# Patient Record
Sex: MALE | Race: WHITE | NOT HISPANIC OR LATINO | Employment: OTHER | URBAN - NONMETROPOLITAN AREA
[De-identification: names, ages, dates, MRNs, and addresses within clinical notes are randomized per-mention and may not be internally consistent; named-entity substitution may affect disease eponyms.]

---

## 2018-02-26 ENCOUNTER — DOCUMENTATION ONLY (OUTPATIENT)
Dept: FAMILY MEDICINE | Facility: OTHER | Age: 63
End: 2018-02-26

## 2023-01-01 ENCOUNTER — OFFICE VISIT (OUTPATIENT)
Dept: FAMILY MEDICINE | Facility: OTHER | Age: 68
End: 2023-01-01
Attending: NURSE PRACTITIONER
Payer: COMMERCIAL

## 2023-01-01 VITALS
DIASTOLIC BLOOD PRESSURE: 70 MMHG | RESPIRATION RATE: 24 BRPM | HEART RATE: 74 BPM | OXYGEN SATURATION: 96 % | SYSTOLIC BLOOD PRESSURE: 100 MMHG | TEMPERATURE: 97.4 F

## 2023-01-01 DIAGNOSIS — R19.7 DIARRHEA, UNSPECIFIED TYPE: ICD-10-CM

## 2023-01-01 DIAGNOSIS — R35.0 URINE FREQUENCY: Primary | ICD-10-CM

## 2023-01-01 PROCEDURE — 99202 OFFICE O/P NEW SF 15 MIN: CPT | Performed by: PHYSICIAN ASSISTANT

## 2023-01-01 PROCEDURE — G0463 HOSPITAL OUTPT CLINIC VISIT: HCPCS | Performed by: PHYSICIAN ASSISTANT

## 2023-01-01 ASSESSMENT — PAIN SCALES - GENERAL: PAINLEVEL: MILD PAIN (2)

## 2023-01-21 NOTE — NURSING NOTE
Pt here for diarrhea for 3 days.  More frequent today.  Maryellen Cheng CMA ([facility])......................1/21/2023  12:04 PM       Medication Reconciliation: complete    Maryellen Cheng CMA  1/21/2023 12:04 PM

## 2023-01-21 NOTE — PROGRESS NOTES
ASSESSMENT/PLAN:     I have reviewed the nursing notes.  I have reviewed the findings, diagnosis, plan and need for follow up with the patient.    66 yo old patient with cognitive impairment. He is not present with staff. Staff note from [employer] in [facility] patient has had diarrhea x 3 days having 5-10 stools per day. Patient states he has mild abdominal discomfort. He reports urine frequency. Patient is afebrile. VSS. Abdomen non tender on exam. Unable to obtain urine or stool sample in clinic. Kit sent home with patient to collect these at home and return back to clinic for results.  Will call with results and recommendations. Return to clinic if symptoms persist/worsen.     1. Urine frequency  - UA with Microscopic reflex to Culture    2. Diarrhea, unspecified type  - C. difficile Toxin B PCR with reflex to C. difficile Antigen and Toxins A/B EIA; Future  - Enteric Bacteria and Virus Panel by GARETH Stool; Future           I explained my diagnostic considerations and recommendations to the patient, who voiced understanding and agreement with the treatment plan. All questions were answered. We discussed potential side effects of any prescribed or recommended therapies, as well as expectations for response to treatments.    Jennifer Rahman PA-C  [facility]          Nursing Notes:   Maryellen Cheng CMA  1/21/2023 12:04 PM  Sign at exiting of workspace  Pt here for diarrhea for 3 days.  More frequent today.  Maryellen Cheng CMA ([facility])......................1/21/2023  12:04 PM       Medication Reconciliation: complete    Maryellen Cheng CMA  1/21/2023 12:04 PM         SUBJECTIVE:   Chase Stallworth is a 67 year old male who presents to clinic today for evaluation of diarrhea. Per staff has had diarrhea x 3 days and having from 5-10 stools per day.  Patient does not recall this. He states he does go to the bathroom frequently and has urine frequency. He is reporting mild  abdominal discomfort.     Patient reports no problem with eating/drinking. No emesis.   Patient denies weakness, dizziness, lightheadedness, blood in urine or stool.   Question reliability of patient history.           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       No Known Allergies      Past medical history, past surgical history, current medications and allergies reviewed and accurate to the best of my knowledge.          OBJECTIVE:     /70 (BP Location: Right arm, Patient Position: Sitting, Cuff Size: Adult Large)   Pulse 74   Temp 97.4  F (36.3  C) (Tympanic)   Resp 24   SpO2 96%   There is no height or weight on file to calculate BMI.    General Appearance: Well appearing male,  No acute distress  Eyes: no injection, no tearing or drainage, eye lids normal  Orophayrnx: moist mucous membranes,   Respiratory: normal respiration, no increased work of breathing, no stridor/retractions/nasal flaring. Lungs Clear to auscultation  Cardiac: RRR with no murmurs  Abdomen: soft, nontender, no rigidity, no rebound tenderness or guarding, normal bowel sounds present. No CVAT. Bowel sounds increased.   Neurological: alert, normal posture and gait.   Dermatological: no rashes noted of exposed skin  Psychological: pleasant, cooperative